# Patient Record
Sex: FEMALE | Race: OTHER | HISPANIC OR LATINO | URBAN - METROPOLITAN AREA
[De-identification: names, ages, dates, MRNs, and addresses within clinical notes are randomized per-mention and may not be internally consistent; named-entity substitution may affect disease eponyms.]

---

## 2023-04-25 ENCOUNTER — EMERGENCY (EMERGENCY)
Facility: HOSPITAL | Age: 38
LOS: 1 days | Discharge: HOME | End: 2023-04-25
Attending: EMERGENCY MEDICINE
Payer: COMMERCIAL

## 2023-04-25 VITALS
TEMPERATURE: 98 F | DIASTOLIC BLOOD PRESSURE: 84 MMHG | SYSTOLIC BLOOD PRESSURE: 125 MMHG | OXYGEN SATURATION: 98 % | HEART RATE: 84 BPM | RESPIRATION RATE: 20 BRPM

## 2023-04-25 PROCEDURE — 99053 MED SERV 10PM-8AM 24 HR FAC: CPT

## 2023-04-25 PROCEDURE — 99284 EMERGENCY DEPT VISIT MOD MDM: CPT

## 2023-04-25 NOTE — ED ADULT TRIAGE NOTE - NS ED NURSE BANDS TYPE
General Surgery:  Consult Note        PATIENT NAME: Nori Madison   YOB: 1987    ADMISSION DATE: 3/15/2022  4:49 PM     Admitting Provider: Frederick Ferraro    Consulted Physician: Dr. Giovanni Gill DATE: 3/16/2022    Chief Complaint: S OB  Consult Regarding: Recent bariatric surgery    HISTORY OF PRESENT ILLNESS:  The patient is a 30 y.o. female  who was admitted on 3/15 complaining of shortness of breath. Patient was well-known to the bariatric surgery service. She recently underwent robotic gastric sleeve revised to Juan-en-Y gastric bypass with umbilical hernia repair on 3/7/2022. She was discharged home on 3/9 and provided a wheeled walker. She reports she has had shortness of breath since discharge. She reports the shortness of breath is worse with exertion which improves with rest.  Reports mild chest discomfort with these episodes. Otherwise, denies nausea, vomiting, fever, chills, dysuria. Patient reports she has not a bowel movement since surgery. She is tolerating bariatric clear liquid diet without difficulty. She reports she is able to do most of her activities of daily living at home. Past Medical History:        Diagnosis Date    Abnormal Pap smear of cervix     Anemia complicating pregnancy in second trimester 2015    COVID-19 2021 night sweats, cough, sore throat x 1 week    Gastroesophageal reflux disease 2019    on rx    Morbidly obese ([address]     Snores     no sleep apnea    Wellness examination     [facility] [address]  last seen   . Seeing 22 for clearance.         Past Surgical History:        Procedure Laterality Date     SECTION  2015    GASTRIC BAND  2019    GASTRIC SLEEVE    JUAN-EN-Y GASTRIC BYPASS  2022    XI ROBOTIC LAPAROSCOPIC GASTRIC SLEEVE TO JUAN-EN-Y GASTRIC BYPASS, EGD    JUAN-EN-Y GASTRIC BYPASS N/A 3/7/2022    XI ROBOTIC LAPAROSCOPIC GASTRIC SLEEVE TO JUAN-EN-Y GASTRIC BYPASS, EGD, WITH UMBILICAL HERNIA REPAIR performed by Lizzeth Montoya DO at [address] N/A 09/18/2019    EGD BIOPSY performed by Douglas Vela MD at [facility] 10/08/2021    [facility]       Medications Prior to Admission:   Not in a hospital admission. Allergies:  Latex    Social History:   Social History     Socioeconomic History    Marital status: Single     Spouse name: Not on file    Number of children: Not on file    Years of education: Not on file    Highest education level: Not on file   Occupational History    Not on file   Tobacco Use    Smoking status: Never Smoker    Smokeless tobacco: Never Used   Vaping Use    Vaping Use: Never used   Substance and Sexual Activity    Alcohol use: No    Drug use: No    Sexual activity: Yes     Partners: Male     Birth control/protection: I.U.D. Other Topics Concern    Not on file   Social History Narrative    Not on file     Social Determinants of Health     Financial Resource Strain: High Risk    Difficulty of Paying Living Expenses: Very hard   Food Insecurity: No Food Insecurity    Worried About Running Out of Food in the Last Year: Never true    Amber of Food in the Last Year: Never true   Transportation Needs:     Lack of Transportation (Medical): Not on file    Lack of Transportation (Non-Medical):  Not on file   Physical Activity:     Days of Exercise per Week: Not on file    Minutes of Exercise per Session: Not on file   Stress:     Feeling of Stress : Not on file   Social Connections:     Frequency of Communication with Friends and Family: Not on file    Frequency of Social Gatherings with Friends and Family: Not on file    Attends Cheondoism Services: Not on file    Active Member of Clubs or Organizations: Not on file    Attends Club or Organization Meetings: Not on file    Marital Status: Not on file   Intimate Partner Violence:     Fear of Current or Ex-Partner: Not on file    Emotionally Abused: Not on file    Physically Abused: Not on file    Sexually Abused: Not on file   Housing Stability:     Unable to Pay for Housing in the Last Year: Not on file    Number of [place] in the Last Year: Not on file    Unstable Housing in the Last Year: Not on file       Family History:       Problem Relation Age of Onset    Cancer Paternal Grandfather         unknown- Lung     Thyroid Disease Maternal Grandmother     Diabetes Maternal Grandfather     Cancer Maternal Grandfather         throat    Hypertension Maternal Grandfather     Depression Mother     Cancer Maternal Aunt         stomach    Cancer Maternal Uncle         prostate    Stroke Maternal Uncle     Diabetes Maternal Uncle        REVIEW OF SYSTEMS:    CONSTITUTIONAL: Denies recent weight loss, fatigue, fevers, chills. HEENT: Denies rhinorrhea, dysphagia, odynphagia. CARDIOVASCULAR: Denies history of MI, recent chest pain. RESPIRATORY: Denies recent history of shortness of breath or history of PE. GASTROINTESTINAL: Denies abdominal pain  GENITOURINARY: Denies increased frequency or dysuria. HEMATOLOGIC/LYMPHATIC: Denies history of anemia or DVTs. ENDOCRINE: Denies history of thyroid problems or diabetes. NEURO: Denies history of CVA, TIA. Review of systems negative unless listed above. PHYSICAL EXAM:    VITALS:  /75   Pulse 86   Temp 99.2 °F (37.3 °C) (Oral)   Resp 17   Wt (!) 304 lb (137.9 kg)   SpO2 100%   BMI 49.07 kg/m²   INTAKE/OUTPUT:     Intake/Output Summary (Last 24 hours) at 3/16/2022 0034  Last data filed at 3/16/2022 0034  Gross per 24 hour   Intake 1231.49 ml   Output    Net 1231.49 ml       CONSTITUTIONAL:  awake, alert, not distressed and morbidly obese  HEENT: Normocephalic/atraumatic, without obvious abnormality.    NECK:  Supple, symmetrical, trachea midline   CARDIOVASCULAR: Regular rate and rhythm without murmurs. LUNGS: Clear to auscultation bilaterally without evidence of wheezing or tachypnea. ABDOMEN: Morbidly obese, soft, nondistended, no TTP. Surgical incisions well approximated covered with surgical glue. Left upper quadrant port site with loop drain covered with dressing with mild strikethrough. INDY drain in RLQ with minimal dark, serosanguineous fluid. MUSCULOSKELETAL: Muscle strength intact in all extremities bilaterally. NEUROLOGIC: CN II- XII intact. Gross motor intact without focal weakness. SKIN: No cyanosis, rashes, or edema noted.    Orientation:   oriented to person, place, and time      CBC with Differential:    Lab Results   Component Value Date    WBC 10.2 03/15/2022    RBC 4.32 03/15/2022    HGB 11.8 03/15/2022    HCT 37.3 03/15/2022     03/15/2022    MCV 86.3 03/15/2022    MCH 27.3 03/15/2022    MCHC 31.6 03/15/2022    RDW 14.6 03/15/2022    LYMPHOPCT 27 03/15/2022    MONOPCT 8 03/15/2022    BASOPCT 0 03/15/2022    MONOSABS 0.85 03/15/2022    LYMPHSABS 2.71 03/15/2022    EOSABS 0.31 03/15/2022    BASOSABS 0.04 03/15/2022    DIFFTYPE NOT REPORTED 10/28/2021     CMP:    Lab Results   Component Value Date     03/15/2022    K 3.7 03/15/2022    CL 99 03/15/2022    CO2 13 03/15/2022    BUN 10 03/15/2022    CREATININE 0.68 03/15/2022    GFRAA >60 03/15/2022    LABGLOM >60 03/15/2022    GLUCOSE 66 03/15/2022    PROT 8.5 10/28/2021    LABALBU 4.3 10/28/2021    CALCIUM 9.4 03/15/2022    BILITOT 0.53 10/28/2021    ALKPHOS 97 10/28/2021    AST 14 10/28/2021    ALT 11 10/28/2021     BMP:    Lab Results   Component Value Date     03/15/2022    K 3.7 03/15/2022    CL 99 03/15/2022    CO2 13 03/15/2022    BUN 10 03/15/2022    LABALBU 4.3 10/28/2021    CREATININE 0.68 03/15/2022    CALCIUM 9.4 03/15/2022    GFRAA >60 03/15/2022    LABGLOM >60 03/15/2022    GLUCOSE 66 03/15/2022       Pertinent Radiology:   CT CHEST PULMONARY EMBOLISM W CONTRAST    Result Date: 3/15/2022  EXAMINATION: CTA OF THE CHEST 3/15/2022 8:08 pm TECHNIQUE: CTA of the chest was performed after the administration of intravenous contrast.  Multiplanar reformatted images are provided for review. MIP images are provided for review. Dose modulation, iterative reconstruction, and/or weight based adjustment of the mA/kV was utilized to reduce the radiation dose to as low as reasonably achievable. COMPARISON: None. HISTORY: ORDERING SYSTEM PROVIDED HISTORY: post op, eval for PE TECHNOLOGIST PROVIDED HISTORY: post op, eval for PE Decision Support Exception - unselect if not a suspected or confirmed emergency medical condition->Emergency Medical Condition (MA) Reason for Exam: post op, eval for PE FINDINGS: Pulmonary Arteries: Pulmonary arteries are not adequately opacified for evaluation. No evidence of intraluminal filling defect within main pulmonary artery and its segmental branches to suggest pulmonary embolism. Subsegmental branches cannot be well evaluated. Main pulmonary artery is normal in caliber. Mediastinum: No evidence of mediastinal lymphadenopathy. The heart and pericardium demonstrate no acute abnormality. There is no acute abnormality of the thoracic aorta. Lungs/pleura: The lungs are without acute process. No focal consolidation or pulmonary edema. No evidence of pleural effusion or pneumothorax. Upper Abdomen: Status post gastric bypass surgery. Limited images of the upper abdomen are otherwise unremarkable. Soft Tissues/Bones: No acute bone or soft tissue abnormality. No evidence of pulmonary embolism or acute pulmonary abnormality. Subsegmental pulmonary arterial branches cannot be well evaluated due to suboptimal opacification of pulmonary arteries. .         ASSESSMENT:  1. 34F 9 days s/p gastric sleeve revised to Jesus-en-Y gastric bypass, now with S OB    Plan:  1. Reviewed the images in person.   Discussed the patient, physical, history, labs, imaging with Dr. Evonne Brandon. 2. CT PE negative for pulmonary embolism. 3. At time of exam, patient's symptoms have completely resolved. 4. Received IV fluids. 5. CBC WNL, BMP with mild hyponatremia at 134, mildly hypoglycemic at 66, responded to apple juice and increased to 87. Otherwise, WNL  6. Benign abdominal exam  7. Patient tolerated p.o. challenge with bariatric clears. 8. Dispo Home per ED. Patient has appointment with Dr. Evonne Brandon on Thursday.       Electronically signed by Maya Ramos DO  on 3/16/2022 at 12:34 AM Name band;

## 2023-04-26 PROCEDURE — 99283 EMERGENCY DEPT VISIT LOW MDM: CPT

## 2023-04-26 PROCEDURE — T1013: CPT

## 2023-04-26 RX ORDER — METHOCARBAMOL 500 MG/1
1500 TABLET, FILM COATED ORAL ONCE
Refills: 0 | Status: COMPLETED | OUTPATIENT
Start: 2023-04-26 | End: 2023-04-26

## 2023-04-26 RX ORDER — ACETAMINOPHEN 500 MG
975 TABLET ORAL ONCE
Refills: 0 | Status: COMPLETED | OUTPATIENT
Start: 2023-04-26 | End: 2023-04-26

## 2023-04-26 RX ORDER — IBUPROFEN 200 MG
600 TABLET ORAL ONCE
Refills: 0 | Status: COMPLETED | OUTPATIENT
Start: 2023-04-26 | End: 2023-04-26

## 2023-04-26 RX ORDER — METHOCARBAMOL 500 MG/1
2 TABLET, FILM COATED ORAL
Qty: 30 | Refills: 0
Start: 2023-04-26 | End: 2023-04-30

## 2023-04-26 RX ORDER — LIDOCAINE 4 G/100G
1 CREAM TOPICAL ONCE
Refills: 0 | Status: COMPLETED | OUTPATIENT
Start: 2023-04-26 | End: 2023-04-26

## 2023-04-26 RX ORDER — IBUPROFEN 200 MG
1 TABLET ORAL
Qty: 28 | Refills: 0
Start: 2023-04-26 | End: 2023-05-02

## 2023-04-26 RX ADMIN — Medication 975 MILLIGRAM(S): at 01:55

## 2023-04-26 RX ADMIN — LIDOCAINE 1 PATCH: 4 CREAM TOPICAL at 01:55

## 2023-04-26 RX ADMIN — Medication 600 MILLIGRAM(S): at 01:54

## 2023-04-26 RX ADMIN — METHOCARBAMOL 1500 MILLIGRAM(S): 500 TABLET, FILM COATED ORAL at 01:54
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
## 2023-04-26 NOTE — ED PROVIDER NOTE - PHYSICAL EXAMINATION
General: In NAD.  Skin: Warm, dry, color normal for race. No rashes or abrasions.   Head: NC/AT.   Eyes: No raccoon eyes. PERRLA, EOMI, no nystagmus.  Ears: No paulson signs or hemotympanum b/l.  Mouth: No dental injuries.  Neck/Back: No abrasions or ecchymosis. Supple, no midline tenderness to palpation. No bony step offs. FROM. +Right sided trapezius tenderness.   Cardiac: Rate and rhythm regular. No audible murmur.  Chest/Lungs: Breath sounds vesicular, symmetrical and without rales, rhonchi or wheezing b/l.   Extremities: Atraumatic. No deformity. Pelvis stable. FROM.  Neuro: GCS 15. A&Ox3. Clear speech, steady gait, no focal deficits. Motor intact. Sensation intact to b/l upper and lower extremities. Ambulatory.   Psych: Normal mood and affect. No apparent risk to self or others.

## 2023-04-26 NOTE — ED PROVIDER NOTE - NSFOLLOWUPINSTRUCTIONS_ED_ALL_ED_FT
- Prescription sent to pharmacy.  - Ibuprofen 600mg every 6 hours as needed for pain.  - Acetaminophen 650mg every 6 hours as needed for pain.   - Please bring all documentation from your ED visit to any related future follow up appointment.  - Please call to schedule follow up appointment with your primary care physician within 24-48 hours.  - Please seek immediate medical attention for any new/worsening, signs/symptoms, or concerns.    Feel better!    - Receta enviada a farmacia.  - Ibuprofeno 600mg cada 6 horas según necesidad para el dolor.  - Acetaminofén 650 mg cada 6 horas según sea necesario para el dolor.   - Traiga toda la documentación de saavedra visita al servicio de urgencias a cualquier darien de seguimiento futura relacionada.  - Llame para programar cory darien de seguimiento con saavedra médico de atención primaria dentro de las 24 a 48 horas.  - Busque atención médica inmediata ante cualquier nuevo / empeoramiento, signos / síntomas o inquietudes.    ¡Sentirse mejor!       Accidente automovilístico    LO QUE NECESITA SABER:    Los accidentes automovilísticos pueden causar lesiones ocasionadas por el impacto o por chilo sido movido de un lado al otro dentro del princess. Podría tener un hematoma en el abdomen, pecho o kunal debido al cinturón de seguridad. También puede que tenga dolor en saavedra prabhakar, kunal o espalda. Podría sentir dolor en las rodillas, caderas o muslos si saavedra cuerpo golpea el tablero o el volante. El dolor muscular tiende a empeorar de 1 a 2 días después del accidente.    INSTRUCCIONES SOBRE EL CINDY HOSPITALARIA:    Llame al número de emergencias local (911 en los [place]) si:  •Usted tiene un nuevo dolor de pecho o éste empeora, o tiene falta de aliento.          Llame a saavedra médico si:  •Usted tiene un dolor nuevo o peor en el abdomen.      •Usted tiene náuseas y vómitos que no mejoran.      •Usted tiene un christian dolor de mary.      •Usted tiene debilidad, hormigueo o adormecimiento en mari brazos o piernas.      •Usted tiene un dolor nuevo o peor que le dificulta el movimiento.      •Usted tiene dolor que aparece de 2 a 3 días después del accidente.      •Usted tiene preguntas o inquietudes acerca de saavedra condición o cuidado.      Medicamentos:  •Analgésicos:Usted podría recibir medicamento para quitarle o reducir el dolor. No espere a que el dolor sea muy intenso para jazmin el medicamento.      •Los GENEVIEVE,francisca el ibuprofeno, ayudan a disminuir la inflamación, el dolor y la fiebre. Jeannie medicamento está disponible con o sin cory receta médica. Los GENEVIEVE pueden causar sangrado estomacal o problemas renales en ciertas personas. Si usted esta tomando un anticoágulante,  siempre pregunte si los AINEs son seguros para usted. Siempre christen la etiqueta de jeannie medicamento y siga las instrucciones. No administre jeannie medicamento a niños menores de 6 meses de estella sin antes obtener la autorización de saavedra médico.      •[place] mari medicamentos francisca se le haya indicado.Consulte con saavedra médico si usted lela que saavedra medicamento no le está ayudando o si presenta efectos secundarios. Infórmele si es alérgico a algún medicamento. Mantenga cory lista actualizada de los medicamentos, las vitaminas y los productos herbales que jaclyn. Incluya los siguientes datos de los medicamentos: cantidad, frecuencia y motivo de administración. Traiga con usted la lista o los envases de las píldoras a mari citas de seguimiento. Lleve la lista de los medicamentos con usted en roxanne de cory emergencia.      Cuidados personales:  •Use hielo y calor.El hielo ayuda a disminuir la inflamación y el dolor. El hielo también puede contribuir a evitar el daño de los tejidos. Use cory compresa de hielo o ponga hielo triturado en cory bolsa de plástico. Cúbrala con cory toalla y aplíquela al área adolorida por 15 a 20 minutos cada hora o francisca se le indique. Después de 2 días, use cory compresa caliente en el área lesionada. Aplique calor francisca se lo recomiende el médico.      •Estire mari músculos cuidadosamente.Marquis ejercicios suaves para estirar mari músculos después de chilo sufrido un accidente automovilístico. Consulte con saavedra médico sobre cuáles ejercicios hacer.      Consejos de seguridad:Lo siguiente puede ayudar a prevenir otro accidente automovilístico o a reducir el riesgo de lesiones:   •Use siempre saavedra cinturón de seguridad.El uso de saavedra cinturón de seguridad ayudará a reducir las lesiones sufridas por accidentes automovilísticos. El cinturón de seguridad debe tener cory veronica que atraviese saavedra pecho y otra que atraviese saavedra regazo.      •Siempre coloque a saavedra hijo en un asiento de seguridad para niños.Use un asiento de seguridad hecho para saavedra edad, altura y peso. Elija un asiento de seguridad que tenga un arnés y un broche. Coloque el asiento de seguridad en la plaza del medio del asiento trasero del automóvil. El asiento de seguridad no debería moverse en ninguna dirección más de 1 pulgada después de ajustarlo. Siga siempre las instrucciones proporcionadas para saavedra asiento de seguridad para ayudarle a colocarlo. Las instrucciones también le indicarán cómo sujetar a saavedra evan en el asiento correctamente. Pregúntele a saavedra médico sobre más información acerca de los asientos de seguridad para niños.   Asiento de seguridad para niños en automóviles           •Disminuya la velocidad.Maneje saavedra princess al límite de velocidad para reducir saavedra riesgo de accidentes automovilísticos.      •No maneje si se siente cansado.Usted reacciona más lentamente cuando está cansado. El tiempo de reacción lento aumentará el riesgo de un accidente automovilístico.      •No hable por teléfono ni envíe mensajes de texto mientras maneje.Usted no reaccionará lo suficientemente rápido en cory emergencia si se distrae con mensajes de texto o conversaciones.      •No consuma drogas ni alcohol antes de manejar.Es probable que se sienta más cansado o tome riesgos que usualmente no tomaría. No maneje después de jazmin medicamentos que le dan sueño. Use un conductor designado o marquis arreglos para que lo lleven a saavedra casa.      •Ayude a saavedra hijo adolescente a convertirse en un conductor seguro.Sea un buen modelo al manejar. Hable con saavedra hijo adolescente sobre las maneras de reducir el riesgo de un accidente automovilístico. Estas incluyen no conducir cuando está cansado y no tener distracciones, francisca un teléfono. Recuérdele a saavedra hijo adolescente que siempre debe ir al límite de velocidad y usar el cinturón de seguridad.      Acuda a mari consultas de control con saavedra médico según le indicaron.Anote mari preguntas para que se acuerde de hacerlas emiliano mari visitas.      Dolor musculoesquelético    Musculoskeletal Pain      "Dolor musculoesquelético" hace referencia a los eric y las molestias en los huesos, las articulaciones, los músculos y los tejidos que los rodean. Jeannie dolor puede ocurrir en cualquier parte del cuerpo. Puede durar un breve período (eduarda) o prolongarse mucho tiempo (crónico).    Es posible que se realicen un examen físico, análisis de laboratorio y estudios de diagnóstico por imágenes para encontrar la causa del dolor musculoesquelético.      Siga estas instrucciones en saavedra casa:    Estilo de estella   •Trate de controlar o reducir los niveles de estrés. El estrés aumenta la tensión muscular y puede empeorar el dolor musculoesquelético. Es importante reconocer cuando está ansioso o estresado y aprender distintas formas de controlar jeannie estado. Woodman puede incluir:  •Yoga o meditación.      •Terapia cognitiva o conductual.      •Acupuntura o terapia de masajes.        •Podrá seguir con todas las actividades, a menos que estas le generen más dolor. Cuando el dolor disminuya, retome las actividades habituales de a poco. Aumente gradualmente la intensidad y la duración de las actividades o del ejercicio que realice.        Control del dolor, la rigidez y la hinchazón                   •El tratamiento puede incluir medicamentos para el dolor y la inflamación que se nidhi por boca o que se aplican sobre la piel. Use los medicamentos de venta rhea y los recetados solamente francisca se lo haya indicado el médico.      •Si el dolor es intenso, el reposo en cama puede ser beneficioso. Acuéstese o siéntese en cualquier posición que sea cómoda, yanique salga de la cama y camine al menos cada dos horas.    •Si se lo indican, aplique calor en la manfred afectada con la frecuencia que le haya indicado el médico. Use la haydee de calor que el médico le recomiende, francisca cory compresa de calor húmedo o cory almohadilla térmica.  •Coloque cory toalla entre la piel y la haydee de calor.      •Aplique calor emiliano 20 a 30 minutos.      •Retire la haydee de calor si la piel se pone de color bernardo brillante. Woodman es especialmente importante si no puede sentir dolor, calor o frío. Puede correr un riesgo mayor de sufrir quemaduras.      •Si se lo indican, aplique hielo sobre la manfred dolorida. Para hacer esto:  •Ponga el hielo en cory bolsa plástica.      •Coloque cory toalla entre la piel y la bolsa.      •Aplique el hielo emiliano 20 minutos, 2 o 3 veces por día.      •Retire el hielo si la piel se pone de color bernardo brillante. Woodman es muy importante. Si no puede sentir dolor, calor o frío, tiene un mayor riesgo de que se dañe la manfred.        Indicaciones generales     •El médico puede recomendarle que consulte a un fisioterapeuta. Esta persona puede ayudarlo a elaborar un programa de ejercicios seguro.      •Si se lo indica el médico, marquis ejercicios de fisioterapia para mejorar el movimiento y la fuerza de la manfred afectada.      •Cumpla con todas las visitas de seguimiento. Woodman es importante. Woodman incluye las visitas al fisioterapeuta.        Comuníquese con un médico si:    •El dolor empeora.      •Los medicamentos no alivian el dolor.      •No puede usar la parte del cuerpo que le duele, francisca un brazo, cory pierna o el kunal.      •Tiene dificultad para dormir.      •Tiene dificultad para realizar las actividades cotidianas.        Solicite ayuda de inmediato si:    •Tiene cory nueva lesión o el dolor empeora o es diferente.      •Tiene adormecimiento u hormigueo en la manfred dolorida.        Resumen    •"Dolor musculoesquelético" hace referencia a los eric y las molestias en los huesos, las articulaciones, los músculos y los tejidos que los rodean.      •Jeannie dolor puede ocurrir en cualquier parte del cuerpo.      •El médico puede recomendarle que consulte a un fisioterapeuta. Esta persona puede ayudarlo a elaborar un programa de ejercicios seguro. Marquis ejercicios francisca se lo haya indicado el fisioterapeuta.      •Disminuya los niveles de estrés. El estrés puede empeorar el dolor musculoesquelético. Entre los métodos para disminuir el estrés se pueden mencionar la meditación, el yoga, la terapia cognitiva o conductual, la acupuntura y la terapia de masajes.      Esta información no tiene francisca fin reemplazar el consejo del médico. Asegúrese de hacerle al médico cualquier pregunta que tenga.

## 2023-04-26 NOTE — ED PROVIDER NOTE - PATIENT PORTAL LINK FT
You can access the FollowMyHealth Patient Portal offered by [facility] by registering at the following website: [email or web address]. By joining [employer]’s FollowMyHealth portal, you will also be able to view your health information using other applications (apps) compatible with our system.

## 2024-06-01 ENCOUNTER — EMERGENCY (EMERGENCY)
Facility: HOSPITAL | Age: 39
LOS: 1 days | Discharge: HOME | End: 2024-06-01
Attending: STUDENT IN AN ORGANIZED HEALTH CARE EDUCATION/TRAINING PROGRAM
Payer: COMMERCIAL

## 2024-06-01 VITALS
WEIGHT: 238.54 LBS | DIASTOLIC BLOOD PRESSURE: 82 MMHG | HEART RATE: 84 BPM | OXYGEN SATURATION: 97 % | SYSTOLIC BLOOD PRESSURE: 131 MMHG | RESPIRATION RATE: 20 BRPM | TEMPERATURE: 98 F

## 2024-06-01 PROCEDURE — T1013: CPT

## 2024-06-01 PROCEDURE — 99284 EMERGENCY DEPT VISIT MOD MDM: CPT

## 2024-06-01 PROCEDURE — 99283 EMERGENCY DEPT VISIT LOW MDM: CPT

## 2024-06-01 RX ORDER — METHOCARBAMOL 500 MG/1
2 TABLET, FILM COATED ORAL
Qty: 24 | Refills: 0
Start: 2024-06-01

## 2024-06-01 RX ORDER — IBUPROFEN 200 MG
600 TABLET ORAL ONCE
Refills: 0 | Status: COMPLETED | OUTPATIENT
Start: 2024-06-01 | End: 2024-06-01

## 2024-06-01 RX ORDER — METHOCARBAMOL 500 MG/1
1500 TABLET, FILM COATED ORAL ONCE
Refills: 0 | Status: COMPLETED | OUTPATIENT
Start: 2024-06-01 | End: 2024-06-01

## 2024-06-01 RX ORDER — LIDOCAINE 4 G/100G
1 CREAM TOPICAL ONCE
Refills: 0 | Status: COMPLETED | OUTPATIENT
Start: 2024-06-01 | End: 2024-06-01

## 2024-06-01 RX ORDER — IBUPROFEN 200 MG
1 TABLET ORAL
Qty: 24 | Refills: 0
Start: 2024-06-01

## 2024-06-01 RX ADMIN — LIDOCAINE 1 PATCH: 4 CREAM TOPICAL at 01:53

## 2024-06-01 RX ADMIN — METHOCARBAMOL 1500 MILLIGRAM(S): 500 TABLET, FILM COATED ORAL at 01:53

## 2024-06-01 RX ADMIN — Medication 600 MILLIGRAM(S): at 01:53

## 2024-06-01 NOTE — ED PROVIDER NOTE - NSFOLLOWUPINSTRUCTIONS_ED_ALL_ED_FT
Radiculopatía cervical  Cervical Radiculopathy  Close-up of the nerves of the cervical spine.  La radiculopatía cervical se presenta cuando un nervio del kunal (un nervio cervical) está comprimido o dañado. Esta afección puede ocurrir debido a cory lesión en la columna vertebral cervical (vértebras) del kunal, o francisca parte del proceso de envejecimiento normal. La compresión de los nervios cervicales puede causar dolor o adormecimiento que se extiende desde el kunal hasta el brazo y los dedos de la mano. Esta afección generalmente mejora con reposo. Si no mejora, starr vez sea necesario administrar un tratamiento.    ¿Cuáles son las causas?  Esta afección puede ser causada por lo siguiente:  Lesión en el kunal.  Un abombamiento (hernia) discal.  Espasmos musculares.  Rigidez de los músculos del kunal debido al uso excesivo.  Artritis.  Fractura o degeneración de los huesos y las articulaciones de la columna (espondiloartrosis) debido al envejecimiento.  Espolones óseos que pueden formarse cerca de los nervios cervicales.  ¿Cuáles son los signos o síntomas?  Los síntomas de esta afección incluyen:  Dolor. El dolor puede extenderse desde el kunal hasta el brazo y la mano. El dolor puede ser intenso o molesto. Puede empeorar cuando mueve el kunal.  Adormecimiento u hormigueo en el brazo o la mano.  Debilidad en el brazo y la mano afectados, en casos graves.  ¿Cómo se diagnostica?  Esta afección se puede diagnosticar en función de los síntomas, la historia clínica y los antecedentes médicos. También pueden hacerle estudios, que incluyen los siguientes:  Radiografías.  Exploración por tomografía computarizada (TC).  Resonancia magnética (RM).  Electromiograma (EMG).  Pruebas de conducción nerviosa.  ¿Cómo se trata?  En muchos casos, no se requiere tratamiento para esta afección. Con reposo, esta suele mejorar con el tiempo. Si es necesario administrar tratamiento, las opciones pueden incluir lo siguiente:  Usar un collarín cervical blando emiliano períodos cortos.  Hacer fisioterapia para fortalecer los músculos del kunal.  Usar medicamentos. Estos pueden incluir antiinflamatorios no esteroideos (GENEVIEVE), francisca ibuprofeno, o corticoesteroides orales.  Aplicarse inyecciones en la columna vertebral, en los casos graves.  Someterse a cory cirugía. Schlusser puede ser necesario si otros tratamientos no son eficaces. Según la causa de esta afección, podrán implementarse diferentes tipos de cirugía.  Siga estas indicaciones en saavedra casa:  Si tiene un collarín cervical:    Úselo francisca se lo haya indicado el médico. Quíteselo solamente farncisca se lo haya indicado el médico.  Pregúntele al médico si puede quitarse el collarín cervical para bañarse e higienizarse. Si lo autorizan a quitarse el collarín para bañarse o higienizarse:  Siga las instrucciones del médico acerca de cómo quitarse el collarín de manera monzon.  Para limpiar el collarín, pásele un paño con agua y jabón suave, y séquelo neisha.  Quite las almohadillas desmontables del collarín, si las tiene, cada 1 o 2 días y lávelas a mano con agua y jabón. Déjelas que se sequen por completo antes de volver a ponerlas en el collarín.  Contrólese la piel debajo del collarín para sin si hay irritación o llagas. Si presenta alguna de estas, informe a saavedra médico.  Control del dolor    Bag of ice on a towel on the skin.   A heating pad for use on the painful area.  Use los medicamentos de venta rhea y los recetados solamente francisca se lo haya indicado el médico.  Si se lo indican, aplique hielo sobre la manfred afectada. Para hacer esto:  Si tiene un collarín cervical blando, quíteselo francisca se lo haya indicado el médico.  Ponga el hielo en cory bolsa plástica.  Coloque cory toalla entre la piel y la bolsa.  Aplique el hielo emiliano 20 minutos, 2 o 3 veces por día.  Retire el hielo si la piel se pone de color bernardo brillante. Schlusser es muy importante. Si no puede sentir dolor, calor o frío, tiene un mayor riesgo de que se dañe la manfred.  Si aplicarse hielo no le jarod el dolor, intente aplicarse calor. Use la haydee de calor que el médico le recomiende, francisca cory compresa de calor húmedo o cory almohadilla térmica.  Coloque cory toalla entre la piel y la haydee de calor.  Aplique calor emiliano 20 a 30 minutos.  Retire la haydee de calor si la piel se pone de color bernardo brillante. Schlusser es especialmente importante si no puede sentir dolor, calor o frío. Corre un mayor riesgo de sufrir quemaduras.  Intente darse un masaje suave en el kunal y el hombro para ayudar a aliviar los síntomas.  Actividad    Descanse todo lo que sea necesario.  Retome mari actividades normales francisca se lo haya indicado el médico. Pregúntele al médico qué actividades son seguras para usted.  Realice ejercicios de elongación y fortalecimiento francisca se lo hayan indicado el médico o el fisioterapeuta.  Es posible que deba evitar levantar objetos. Pregúntele al médico cuánto peso puede levantar sin correr riesgos.  Indicaciones generales    Use cory almohada plana para dormir.  No conduzca mientras usa un collarín cervical. Si no tiene un collarín cervical, pregúntele al médico si es seguro que conduzca emiliano el proceso de curación del kunal.  Pregúntele al médico si el medicamento recetado le impide conducir o usar maquinaria.  No consuma ningún producto que contenga nicotina o tabaco. Estos productos incluyen cigarrillos, tabaco para mascar y aparatos de vapeo, francisca los cigarrillos electrónicos. Si necesita ayuda para dejar de consumir estos productos, consulte al médico.  Concurra a todas las visitas de seguimiento. Schlusser es importante.  Comuníquese con un médico si:  La afección no mejora con tratamiento.  Solicite ayuda de inmediato si:  El dolor se intensifica mucho y no se jarod con los medicamentos.  Siente debilidad o adormecimiento en la mano, el brazo, el zack o la pierna.  Tiene fiebre shawn.  Tiene rigidez de kunal.  Pierde el control de la vejiga o los intestinos (tiene incontinencia).  Tiene dificultad para caminar, mantener el equilibrio o hablar.  Resumen  La radiculopatía cervical se presenta cuando un nervio del kunal está comprimido o dañado.  Un nervio puede pinzarse por un abultamiento discal, artritis, espasmos musculares o cory lesión en el kunal.  Los síntomas incluyen dolor, hormigueo o adormecimiento que se irradia desde el kunal hacia el brazo o la mano. En los casos graves, también puede presentarse debilidad.  El tratamiento puede incluir reposo, fisioterapia y usar un collarín cervical. Pueden recetarle medicamentos para aliviar el dolor. En casos graves, starr vez haya que aplicar inyecciones o realizar cory cirugía.  Esta información no tiene francisca fin reemplazar el consejo del médico. Asegúrese de hacerle al médico cualquier pregunta que tenga.

## 2024-06-01 NOTE — ED ADULT NURSE NOTE - OBJECTIVE STATEMENT
Patient presents to ED with c/o left arm numbness radiating upwards towards chest, across chest into neck times 3 weeks.  Last Tylenol noon yesterday.  Denies cardiac history.  (+) injury to left arm yesterday while carrying a box.  Patient neurologically intact in triage.

## 2024-06-01 NOTE — ED PROVIDER NOTE - PHYSICAL EXAMINATION
Constitutional: Awake, alert, in no acute distress  Eyes: no scleral icterus  HENT: normocephalic, atraumatic, moist oral mucosa  Neck: supple, L > R paracervical tenderness/spasm  CV: RRR, no murmur  Pulm: non-labored respirations, CTAB  Abdomen: soft, non-tender, non-distended  Back: no midline spinal tenderness  Extremities: no edema, no deformity  Skin: no rash, no jaundice  Neuro: AAOx3, moving all extremities equally, equal strength/sensation in all extremities

## 2024-06-01 NOTE — ED PROVIDER NOTE - CARE PROVIDER_API CALL
Alexis Sheltonul  Neurosurgery  [address] [place] [id number]  Phone: [phone]  Fax: [phone]  Follow Up Time:

## 2024-06-01 NOTE — ED PROVIDER NOTE - OBJECTIVE STATEMENT
38y F w/ no significant PMH presents with muscle spasms. Pt complaining of 3 weeks of progressively worsening pain radiating from left neck down to left hand, associated with paresthesias, worse with movement. Paresthesias appear to be localized over C6 distribution. Also endorsing some pain/paresthesias to left leg. Last took tylenol for pain earlier today. No fever, trauma, weakness, bowel/bladder dysfunction.

## 2024-06-01 NOTE — ED PROVIDER NOTE - NS ED ROS FT
Constitutional: no fever  CV: no chest pain  Resp: no cough, no shortness of breath  GI: no abdominal pain, no vomiting, no diarrhea  : no dysuria  MSK: +arm/neck/back pain  Neuro: no headache

## 2024-06-01 NOTE — ED PROVIDER NOTE - CLINICAL SUMMARY MEDICAL DECISION MAKING FREE TEXT BOX
38y F presents for 3 weeks of progressively worsening neck/back/arm pain. No focal neuro deficits, no red flag symptoms. Likely cervical radiculopathy. Will treat for pain. Medically stable for discharge with outpatient f/u. 38y F presents for 3 weeks of progressively worsening neck/back/arm pain. No focal neuro deficits, no red flag symptoms. Likely cervical radiculopathy. Treated symptomatically with improvement. Medically stable for discharge with outpatient f/u.

## 2024-06-01 NOTE — ED PROVIDER NOTE - PATIENT PORTAL LINK FT
You can access the FollowMyHealth Patient Portal offered by [facility] by registering at the following website: [email or web address]. By joining [employer]’s FollowMyHealth portal, you will also be able to view your health information using other applications (apps) compatible with our system.

## 2024-06-01 NOTE — ED ADULT TRIAGE NOTE - CHIEF COMPLAINT QUOTE
Patient presents to ED with c/o left arm numbness radiating upwards towards chest, across chest into neck times 3 weeks.  Last Tylenol noon yesterday.  Denies cardiac history.  (+) injury to left arm yesterday while carrying a box. Patient presents to ED with c/o left arm numbness radiating upwards towards chest, across chest into neck times 3 weeks.  Last Tylenol noon yesterday.  Denies cardiac history.  (+) injury to left arm yesterday while carrying a box.  Patient neurologically intact in triage.

## 2024-06-25 ENCOUNTER — OFFICE (OUTPATIENT)
Dept: URBAN - METROPOLITAN AREA CLINIC 115 | Facility: CLINIC | Age: 39
Setting detail: OPHTHALMOLOGY
End: 2024-06-25
Payer: MEDICAID

## 2024-06-25 DIAGNOSIS — H16.223: ICD-10-CM

## 2024-06-25 DIAGNOSIS — H43.391: ICD-10-CM

## 2024-06-25 DIAGNOSIS — H01.005: ICD-10-CM

## 2024-06-25 DIAGNOSIS — H52.03: ICD-10-CM

## 2024-06-25 DIAGNOSIS — H01.002: ICD-10-CM

## 2024-06-25 PROCEDURE — 92015 DETERMINE REFRACTIVE STATE: CPT | Performed by: OPHTHALMOLOGY

## 2024-06-25 PROCEDURE — 92004 COMPRE OPH EXAM NEW PT 1/>: CPT | Performed by: OPHTHALMOLOGY

## 2024-06-25 PROCEDURE — 92250 FUNDUS PHOTOGRAPHY W/I&R: CPT | Performed by: OPHTHALMOLOGY

## 2024-06-25 ASSESSMENT — CONFRONTATIONAL VISUAL FIELD TEST (CVF)
OD_FINDINGS: FULL
OS_FINDINGS: FULL

## 2024-06-25 ASSESSMENT — LID EXAM ASSESSMENTS
OD_BLEPHARITIS: RLL T
OS_BLEPHARITIS: LLL T